# Patient Record
(demographics unavailable — no encounter records)

---

## 2024-10-11 NOTE — PHYSICAL EXAM
[No Respiratory Distress] : no respiratory distress  [No Accessory Muscle Use] : no accessory muscle use [Normal] : affect was normal and insight and judgment were intact [de-identified] : mild edema of lower ext [de-identified] : RLE w/ dried wound

## 2024-10-11 NOTE — HISTORY OF PRESENT ILLNESS
[Post-hospitalization from ___ Hospital] : Post-hospitalization from [unfilled] Hospital [Admitted on: ___] : The patient was admitted on [unfilled] [Discharged on ___] : discharged on [unfilled] [FreeTextEntry2] : 67-year-old female with a history of breast cancer, colon cancer, skin cancer, DVT on anticoagulation, hyperlipidemia, overactive bladder, asthma is coming in for post hospital discharge follow up for leg swelling.  Hospital course unclear, as no hospital records were sent over. As per patient, DVT was ruled out and diuretics were given? Unclear if TTE was done.   Swelling slightly improved now, did have a wound that has now dried up.   Last chemo was 8/27/24 for breast cancer. Wants new medical oncologist.

## 2024-10-11 NOTE — ASSESSMENT
[FreeTextEntry1] : 67-year-old female with a history of breast cancer, colon cancer, skin cancer, DVT on anticoagulation, hyperlipidemia, overactive bladder, asthma is coming in for post hospital discharge follow up for leg swelling.  #Leg swelling  -CBC, CMP, Pro-BNP ordered -Requested records from [facility] -May need TTE  RTC 1 month

## 2024-10-11 NOTE — ASSESSMENT
[FreeTextEntry1] : 67-year-old female with a history of breast cancer, colon cancer, skin cancer, DVT on anticoagulation, hyperlipidemia, overactive bladder, asthma is coming in for post hospital discharge follow up for leg swelling.  #Leg swelling  -CBC, CMP, Pro-BNP ordered -Requested records from [facility] -May need TTE  RTC 1 month

## 2024-10-11 NOTE — PHYSICAL EXAM
[No Respiratory Distress] : no respiratory distress  [No Accessory Muscle Use] : no accessory muscle use [Normal] : affect was normal and insight and judgment were intact [de-identified] : mild edema of lower ext [de-identified] : RLE w/ dried wound

## 2024-11-08 NOTE — PHYSICAL EXAM
[Normal Sclera/Conjunctiva] : normal sclera/conjunctiva [PERRL] : pupils equal round and reactive to light [EOMI] : extraocular movements intact [No Lymphadenopathy] : no lymphadenopathy [Supple] : supple [Thyroid Normal, No Nodules] : the thyroid was normal and there were no nodules present [No Respiratory Distress] : no respiratory distress  [No Accessory Muscle Use] : no accessory muscle use [Clear to Auscultation] : lungs were clear to auscultation bilaterally [Normal Rate] : normal rate  [Regular Rhythm] : with a regular rhythm [No Edema] : there was no peripheral edema [Soft] : abdomen soft [Non Tender] : non-tender [Non-distended] : non-distended [Normal Supraclavicular Nodes] : no supraclavicular lymphadenopathy [Normal Anterior Cervical Nodes] : no anterior cervical lymphadenopathy [Normal] : affect was normal and insight and judgment were intact [No Focal Deficits] : no focal deficits [de-identified] : Thyroid slightly enlarged on PE

## 2024-11-08 NOTE — HISTORY OF PRESENT ILLNESS
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  Colon cancer: Laparoscopic anterior resection for RECTAL CANCER (stage I). Dr. Navdeep KONG. Postoperatively he met with medical oncology (Dr. Darren CASH). No adjuvant therapy recommended for her CRC. Had Colonoscopy May 2023. Due in May 2025.  Skin Cancer: s/p Mohs Surgery, not seeing Derm regularly  R Breast Cancer diagnosed in 2020: Lumpectomy Dr. Jonah SULLIVAN breast cancer recently diagnosed 2/24. Had 4 sessions of chemo at [facility], Dr. Granado. Also s/p lumpectomy   R knee pain worsening for past 6 months.   Going to see Dr. John Moody-Onc at [facility] for radiation for breast cancer.   Seeing Vascular Surgeon Dr. Chema Reddy for history of DVT/PE

## 2024-11-08 NOTE — ASSESSMENT
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  #Breast Cancer  -Going to see Dr. Lopez - Rad-Onc at [facility] for radiation  #Hx of colon cancer -Due for colonoscopy in [place]y 2025  #DVT/PE -Following w/ Vascular, on indefinite AC -C/w Eliquis 5mg BID  #Hypthyroidism -Discussed how levothyroxine is preferred to armour thyroid. Patient prefers not to switch  #HLD -Discussed recommendation for statin, patient refusing -Recheck lipid profile in 3 months  #HCM -Reviewed bw  -Colonoscopy next year  -DEXA ordered  -Mammo and breast US scheduled  -Recommended that patient see Derm at least annually, given history of skin cancer   RTC in 1 month for further HCM needs

## 2024-11-08 NOTE — PHYSICAL EXAM
[Normal Sclera/Conjunctiva] : normal sclera/conjunctiva [PERRL] : pupils equal round and reactive to light [EOMI] : extraocular movements intact [No Lymphadenopathy] : no lymphadenopathy [Supple] : supple [Thyroid Normal, No Nodules] : the thyroid was normal and there were no nodules present [No Respiratory Distress] : no respiratory distress  [No Accessory Muscle Use] : no accessory muscle use [Clear to Auscultation] : lungs were clear to auscultation bilaterally [Normal Rate] : normal rate  [Regular Rhythm] : with a regular rhythm [No Edema] : there was no peripheral edema [Soft] : abdomen soft [Non Tender] : non-tender [Non-distended] : non-distended [Normal Supraclavicular Nodes] : no supraclavicular lymphadenopathy [Normal Anterior Cervical Nodes] : no anterior cervical lymphadenopathy [Normal] : affect was normal and insight and judgment were intact [No Focal Deficits] : no focal deficits [de-identified] : Thyroid slightly enlarged on PE

## 2024-11-08 NOTE — HISTORY OF PRESENT ILLNESS
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  Colon cancer: Laparoscopic anterior resection for RECTAL CANCER (stage I). Dr. Navdeep KONG. Postoperatively he met with medical oncology (Dr. Darren CASH). No adjuvant therapy recommended for her CRC. Had Colonoscopy May 2023. Due in May 2025.  Skin Cancer: s/p Mohs Surgery, not seeing Derm regularly  R Breast Cancer diagnosed in 2020: Lumpectomy Dr. Jonah SULLIVAN breast cancer recently diagnosed 2/24. Had 4 sessions of chemo at [facility], Dr. Granado. Also s/p lumpectomy   R knee pain worsening for past 6 months.   Going to see Dr. John Moody-Onc at [facility] for radiation for breast cancer.   Seeing Vascular Surgeon Dr. Chema Reddy for history of DVT/PE

## 2024-11-08 NOTE — ASSESSMENT
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  #Breast Cancer  -Going to see Dr. Lopez - Rad-Onc at [facility] for radiation  #Hx of colon cancer -Due for colonoscopy in MAy 2025  #DVT/PE -Following w/ Vascular, on indefinite AC -C/w Eliquis 5mg BID  #Hypthyroidism -Discussed how levothyroxine is preferred to armour thyroid. Patient prefers not to switch  #HLD -Discussed recommendation for statin, patient refusing -Recheck lipid profile in 3 months  #HCM -Reviewed bw  -Colonoscopy next year  -DEXA ordered  -Mammo and breast US scheduled  -Recommended that patient see Derm at least annually, given history of skin cancer   RTC in 1 month for further HCM needs

## 2024-11-08 NOTE — HEALTH RISK ASSESSMENT
[Yes] : Yes [2 - 4 times a month (2 pts)] : 2-4 times a month (2 points) [1 or 2 (0 pts)] : 1 or 2 (0 points) [Never (0 pts)] : Never (0 points) [No] : In the past 12 months have you used drugs other than those required for medical reasons? No [Never] : Never [Audit-CScore] : 2 [ColonoscopyDate] : 05/23 [ColonoscopyComments] : Repeat in 2 years is recommended

## 2024-12-09 NOTE — HISTORY OF PRESENT ILLNESS
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   Discussed DEXA results.   L leg swelling chronic, has not seen Vascular in a while.   Finished with radiation, last session on 12/3. Going to see Rad-Onc in 1 month.

## 2024-12-09 NOTE — ASSESSMENT
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   #HLD -Discussed LDL once again, statin benefits vs risks  -Patient willing to try statin -Start Crestor 10mg qd, will recheck LDL-C in 2-3 months   #OSteopenia -Calcium + Vit D supplements  -Repeat DEXA in 2 years   #MAcrocytosis #Neuropathic pain -B12 and folate ordered  #L leg swelling -Recommended for patient to follow up w/ her Vascular Surgeon  RTC in 3 months to cehck lipid profile and CMP

## 2024-12-11 NOTE — HISTORY OF PRESENT ILLNESS
[FreeTextEntry1] : This patient presents today for follow-up regarding posttraumatic osteoarthritis about the right knee.  She continues complain of significant pain about the right knee.  We did have authorization earlier in the year the patient had issues with breast cancer did not wish to proceed with the surgery.  She presents today with worsening pain about the knee right 10 out of 10.  Pain worse with activity improved with rest.  She takes Tylenol Extra Strength for the pain.

## 2024-12-11 NOTE — PHYSICAL EXAM
[FreeTextEntry2] : The patient appears well nourished  and in no apparent distress.  The patient is alert and oriented to person, place, and time.   Affect and mood appear normal.    The head is normocephalic and atraumatic.  The eyes reveal normal sclera and extra ocular muscles are intact.   The neck appears normal with no jugular venous distention or masses noted.   Skin shows normal turgor with no evidence of eczema or psoriasis.  No respiratory distress noted.  The patient ambulates with a normal gait.  The right knee has a slight flexion contracture of approximately 5. The patient is able to flex to approximately 120. There is crepitations with range of motion.  There is varus alignment.  There is tenderness to palpation about the medial joint..There is a negative Micah sign. There is no effusion. There is no soft tissue swelling, warmth, or erythema.   There is a negative Lachman sign.  There is no instability to varus/valgus stress or anterior/posterior drawer.  There is normal strength in the in the quadriceps and hamstring muscles.  Sensation is intact to the lower estremity. There are normal pulses distally and good capillary refill. No edema or lymphadenopathy noted.

## 2024-12-11 NOTE — DISCUSSION/SUMMARY
[de-identified] : The patient presents today for follow-up regarding posttraumatic osteoarthritis of the right knee.  She is ready to proceed with right knee replacement surgery.  We did obtain authorization earlier in the year but she had to postpone secondary to her breast cancer diagnosis.  She is ready to proceed with surgery now she is having severe pain and problems ambulating any significant degree.  Will rerequest authorization from [employer] and call her when the authorization is received and we could begin to schedule her surgery.  The risks benefits and alternative treatment plans of the surgical procedure were explained to the patient. The patient had the opportunity to ask any questions which answered to their satisfaction.   At least 30 minutes was spent performing the evaluation and management on today's office visit.  This includes but is not limited to preparing to see patient including review of any test results or outside medical records, obtaining and/or reviewing separately obtained history, performing examination and evaluation, counseling and educating the patient on their diagnosis and treatment recommendations, ordering medications, tests, or procedures, documenting clinical information in the electronic health record, independently interpreting results (not separately reported) and communicating results to the patient, and coordination of care.

## 2024-12-11 NOTE — REASON FOR VISIT
[Follow-Up Visit] : a follow-up visit for [Knee Pain] : knee pain [FreeTextEntry2] : follow up evaluation of post traumatic osteoarthritis of the right knee.

## 2025-01-17 NOTE — HEALTH RISK ASSESSMENT
[Other reason not done] : Other reason not done [Patient unable to screen] : Patient unable to screen

## 2025-01-17 NOTE — ASSESSMENT
[FreeTextEntry1] : 20 minutes were spent discussing concerns/medical conditions excluding procedure time.

## 2025-01-17 NOTE — REVIEW OF SYSTEMS
[Cough] : cough [Negative] : Constitutional [Shortness Of Breath] : no shortness of breath [Wheezing] : no wheezing [Dyspnea on Exertion] : no dyspnea on exertion

## 2025-01-17 NOTE — HISTORY OF PRESENT ILLNESS
[Home] : at home, [unfilled] , at the time of the visit. [Medical Office: ([facility])___] : at the medical office located in  [Verbal consent obtained from patient] : the patient, [unfilled] [FreeTextEntry8] : Pt has been experiencing productive cough since the last 7 days, worse at night. She suffers from asthma and had prior Hx of bronchitis and refer feeling the same way now

## 2025-01-17 NOTE — HISTORY OF PRESENT ILLNESS
[Home] : at home, [unfilled] , at the time of the visit. [Medical Office: ([facility])___] : at the medical office located in  [Verbal consent obtained from patient] : the patient, [unfilled] [FreeTextEntry8] : Pt has been experiencing productive cough since the last 7 days, worse at night. She suffers from asthma and had prior Hx of bronchitis and refer feeling the same way now

## 2025-03-11 NOTE — HISTORY OF PRESENT ILLNESS
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  Took her rosuvastatin initially for about 2 weeks, then developed myalgias and discontinued her statin. Has not taken anything since then. Wants to have her A1C checked too.   Otherwise, no acute complaints. Has been working on dietary modification

## 2025-03-11 NOTE — PHYSICAL EXAM
[No Respiratory Distress] : no respiratory distress  [No Accessory Muscle Use] : no accessory muscle use [Clear to Auscultation] : lungs were clear to auscultation bilaterally [Normal Rate] : normal rate  [Regular Rhythm] : with a regular rhythm [Normal] : affect was normal and insight and judgment were intact

## 2025-03-11 NOTE — ASSESSMENT
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  #HLD -Check lipid profile and CMP today  #preDM -Check A1C  RTC depending on results

## 2025-04-09 NOTE — HISTORY OF PRESENT ILLNESS
[Asthma] : asthma [Chronic Anticoagulation] : chronic anticoagulation [(Patient denies any chest pain, claudication, dyspnea on exertion, orthopnea, palpitations or syncope)] : Patient denies any chest pain, claudication, dyspnea on exertion, orthopnea, palpitations or syncope [Moderate (4-6 METs)] : Moderate (4-6 METs) [Anticoagulants: _____] : Anticoagulants: [unfilled] [Aortic Stenosis] : no aortic stenosis [Atrial Fibrillation] : no atrial fibrillation [Coronary Artery Disease] : no coronary artery disease [Recent Myocardial Infarction] : no recent myocardial infarction [Implantable Device/Pacemaker] : no implantable device/pacemaker [COPD] : no COPD [Sleep Apnea] : no sleep apnea [Smoker] : not a smoker [Family Member] : no family member with adverse anesthesia reaction/sudden death [Self] : no previous adverse anesthesia reaction [Chronic Kidney Disease] : no chronic kidney disease [Diabetes] : no diabetes [FreeTextEntry1] : Right Knee Replacement [FreeTextEntry2] : 4/10/25 [FreeTextEntry4] : 68-year-old female with a history of breast cancer, colon cancer (s/p sigmoid resection), skin cancer s/p Mohs Surgery, 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  Saw Vascular Dr. Chema Reddy Tel: [phone], Fax: [phone]. Plan to stop Eliquis Friday 4/4, then Saturday 4/5 will have IVC Filter, will then likely resume Eliquis 4/5 - 4/6. Will Then hold Eliquis 4/7 - 4/10  Last year, saw Cardiologist Dr. Roman Perkins, cleared for surgery at that time. Had TTE done.   Following w/ Oncologist Dr. Dale Jara in [place]  [FreeTextEntry7] : 4/23/24 TTE showed normal LVEF w/ no regional wall motion abnormalities and no significant valvular dysfunction.

## 2025-04-09 NOTE — HISTORY OF PRESENT ILLNESS
[Asthma] : asthma [Chronic Anticoagulation] : chronic anticoagulation [(Patient denies any chest pain, claudication, dyspnea on exertion, orthopnea, palpitations or syncope)] : Patient denies any chest pain, claudication, dyspnea on exertion, orthopnea, palpitations or syncope [Moderate (4-6 METs)] : Moderate (4-6 METs) [Anticoagulants: _____] : Anticoagulants: [unfilled] [Aortic Stenosis] : no aortic stenosis [Atrial Fibrillation] : no atrial fibrillation [Coronary Artery Disease] : no coronary artery disease [Recent Myocardial Infarction] : no recent myocardial infarction [Implantable Device/Pacemaker] : no implantable device/pacemaker [COPD] : no COPD [Sleep Apnea] : no sleep apnea [Smoker] : not a smoker [Family Member] : no family member with adverse anesthesia reaction/sudden death [Self] : no previous adverse anesthesia reaction [Chronic Kidney Disease] : no chronic kidney disease [Diabetes] : no diabetes [FreeTextEntry1] : Right Knee Replacement [FreeTextEntry2] : 4/10/25 [FreeTextEntry4] : 68-year-old female with a history of breast cancer, colon cancer (s/p sigmoid resection), skin cancer s/p Mohs Surgery, 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  Saw Vascular Dr. Chema Reddy Tel: [phone], Fax: [phone]. Plan to stop Eliquis Friday 4/4, then Saturday 4/5 will have IVC Filter, will then likely resume Eliquis 4/5 - 4/6. Will Then hold Eliquis 4/7 - 4/10  Last year, saw Cardiologist Dr. Roman Perkins, cleared for surgery at that time. Had TTE done.   Following w/ Oncologist Dr. Dale Jara in [place]  [FreeTextEntry7] : 4/23/24 TTE showed normal LVEF w/ no regional wall motion abnormalities and no significant valvular dysfunction.

## 2025-04-09 NOTE — HISTORY OF PRESENT ILLNESS
[Asthma] : asthma [Chronic Anticoagulation] : chronic anticoagulation [(Patient denies any chest pain, claudication, dyspnea on exertion, orthopnea, palpitations or syncope)] : Patient denies any chest pain, claudication, dyspnea on exertion, orthopnea, palpitations or syncope [Moderate (4-6 METs)] : Moderate (4-6 METs) [Anticoagulants: _____] : Anticoagulants: [unfilled] [Aortic Stenosis] : no aortic stenosis [Atrial Fibrillation] : no atrial fibrillation [Coronary Artery Disease] : no coronary artery disease [Recent Myocardial Infarction] : no recent myocardial infarction [Implantable Device/Pacemaker] : no implantable device/pacemaker [COPD] : no COPD [Sleep Apnea] : no sleep apnea [Smoker] : not a smoker [Family Member] : no family member with adverse anesthesia reaction/sudden death [Self] : no previous adverse anesthesia reaction [Chronic Kidney Disease] : no chronic kidney disease [Diabetes] : no diabetes [FreeTextEntry1] : Right Knee Replacement [FreeTextEntry2] : 4/10/25 [FreeTextEntry4] : 68-year-old female with a history of breast cancer, colon cancer (s/p sigmoid resection), skin cancer s/p Mohs Surgery, 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  Saw Vascular Dr. Chema Reddy Tel: [phone], Fax: [phone]. Plan to stop Eliquis Friday 4/4, then Saturday 4/5 will have IVC Filter, will then likely resume Eliquis 4/5 - 4/6. Will Then hold Eliquis 4/7 - 4/10  Last year, saw Cardiologist Dr. Roman Perkins, cleared for surgery at that time. Had TTE done.   Following w/ Oncologist Dr. Dale Jara in [place]  [FreeTextEntry7] : 4/23/24 TTE showed normal LVEF w/ no regional wall motion abnormalities and no significant valvular dysfunction.

## 2025-04-09 NOTE — ASSESSMENT
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  #Preop exam -Reviewed CBC, CMP, EKG from PST, results stable. -Cleared by Vascular surgery -METS>4, no active cardiac symptoms, TTE last year that was unremarkable   Patient is medically optimzied for this intermediate risk surgery.  [FreeTextEntry5] : As per Vascular Surgery recommendations  [FreeTextEntry7] :  As per surgical team

## 2025-04-09 NOTE — ASSESSMENT
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  #Preop exam -Reviewed CBC, CMP, EKG from PST, results stable. -Cleared by Vascular surgery -METS>4, no active cardiac symptoms, TTE last year that was unremarkable   Patient is medically optimzied for this intermediate risk surgery.  [FreeTextEntry5] : As per Vascular Surgery recommendations  [FreeTextEntry7] :  As per surgical team

## 2025-04-23 NOTE — HISTORY OF PRESENT ILLNESS
[___ Weeks Post Op] : [unfilled] weeks post op [7] : the patient reports pain that is 7/10 in severity [Chills] : no chills [Fever] : no fever [Nausea] : no nausea [Vomiting] : no vomiting [de-identified] : Right total knee replacement DOS: 04/10/25 [de-identified] : This patient presents for the first postoperative visit.  She is status post total knee replacement performed approximately 13 days ago.  Pain level 7 out of 10.  She notes some numbness and tingling in her foot.  She notes some swelling and stiffness about the knee.  Denies fever or chills.  Denies any drainage from the incision.  She is currently in inpatient rehabilitation facility. [de-identified] : Exam of the right knee reveals range of motion is satisfactory.  The incision is clean, dry, and intact.   No evidence of any drainage is noted.  There is no warmth or erythema.  There is no ecchymosis.   There is no effusion.  The knee is stable to varus and valgus stress.  There is a negative posterior drawer.  Extensor mechanism is intact.  Patellofemoral tracking is satisfactory.  Normal puluses and sensation distally.    No edema or lymphadenopathy noted.  Strength and sensation are intact distally. [de-identified] : AP, lateral, and merchant views of the right knee were obtained.  The patient is status post total knee replacement.  The components are well fixed with good alignment. No evidence of loosening or periprosthetic fracture is noted. [de-identified] : The Patient is doing well status post total knee replacement.  The patient is going to start outpatient physical therapy.  The patient was given a prescription for outpatient physical therapy.  She can start outpatient therapy when she is discontinued from the rehabilitation facility..  Instructions were given regarding restrictions on activity level.  The patient should avoid excessive walking and stair climbing until the next post op visit. The patient was also given instructions on bathing and wound care.  The patient can shower and pat the incision dry with a towel.  Patient should avoid any pools or tubs for the next 2 weeks.   If the Dermabond tape has not been removed the patient can remove the tape at the end of this week after showering. Instructions were given regarding continuing  post operative anticoagulation.  We will see the patient back in 4 weeks for follow-up x-rays and reevaluation.

## 2025-06-03 NOTE — DISCUSSION/SUMMARY
[de-identified] : The patient presents today for follow-up status post right total knee replacement.  She did have a cellulitis of the knee treated at [facility] with antibiotics.  She finished her antibiotics and her physical exam does not reveal evidence of any residual cellulitis.  She is doc start outpatient physical therapy.  She was given prescription to that effect.  I will see her back in 6 weeks for follow-up and reevaluation.  In addition we renewed her oxycodone prescription as she is running low.

## 2025-06-03 NOTE — REASON FOR VISIT
[Workers' Comp: Date of Injury: _______] : This visit is related to worker's compensation. Date of Injury: [unfilled] [FreeTextEntry2] : right knee pain

## 2025-06-03 NOTE — HISTORY OF PRESENT ILLNESS
[FreeTextEntry1] : This patient presents today for follow-up status post right total knee replacement approxi-6 weeks ago.  She was seen in [facility] on May 19 where she was noted to have a cellulitis of the knee.  She was placed on antibiotics and she presents today for routine follow-up approximately 6 weeks postop.  She notes soreness and increased pain at nighttime.  She also notes some intermittent swelling.  Denies fever or chills.  Currently ambulating with a cane. [Has the patient missed work because of the injury/illness?] : The patient has missed work because of the injury/illness. [No] : The patient is currently not working.

## 2025-06-03 NOTE — ASSESSMENT
[Indicate if, in your opinion, the incident that the patient described was the competent medical cause of this injury/illness.] : The incident that the patient described was the competent medical cause of this injury/illness: Yes [Indicate if the patient's complaints are consistent with his/her history of the injury/illness.] : Indicate if the patient's complaints are consistent with his/her history of the injury/illness: Yes [Yes] : Yes, it is consistent [FreeTextEntry5] : 100

## 2025-07-16 NOTE — REASON FOR VISIT
[Workers' Comp: Date of Injury: _______] : This visit is related to worker's compensation. Date of Injury: [unfilled] [Post Operative Visit] : a post operative visit for [FreeTextEntry2] : s/p right TKR 4/10/2025

## 2025-07-16 NOTE — HISTORY OF PRESENT ILLNESS
[FreeTextEntry1] : This patient presents today status post right total knee replacement on April 10 of this year.  Patient notes some soreness intermittently with activity.  She is going to therapy twice a week.  She uses a cane for ambulation.  She takes Tylenol for the pain.  Denies any fever or chills.  Denies any drainage from the incision.  Overall doing very well after knee replacement surgery. [Has the patient missed work because of the injury/illness?] : The patient has missed work because of the injury/illness. [No] : The patient is currently not working.

## 2025-07-16 NOTE — PHYSICAL EXAM
[de-identified] : The patient appears well nourished  and in no apparent distress.  The patient is alert and oriented to person, place, and time.   Affect and mood appear normal.    The head is normocephalic and atraumatic.  The eyes reveal normal sclera and extra ocular muscles are intact.   The neck appears normal with no jugular venous distention or masses noted.   Skin shows normal turgor with no evidence of eczema or psoriasis.  No respiratory distress noted.  The patient ambulates with a normal gait.  Exam of the right knee reveals range of motion is satisfactory from 0 to 125 degrees..  The incision is well-healed.   No evidence of any drainage is noted.  There is no warmth or erythema.  There is no ecchymosis.   There is no effusion.  The knee is stable to varus and valgus stress.  There is a negative posterior drawer.  Extensor mechanism is intact.  Patellofemoral tracking is satisfactory.  Normal puluses and sensation distally.    No edema or lymphadenopathy noted.  Strength and sensation are intact distally. [de-identified] : AP, lateral, and merchant views of the right knee were obtained.  The patient is status post total knee replacement.  The components are well fixed with good alignment. No evidence of loosening or periprosthetic fracture is noted.

## 2025-07-16 NOTE — DISCUSSION/SUMMARY
[de-identified] : This patient presents in follow-up regarding right total knee replacement performed approximately 3 months ago.  Patient doing very well with regards to knee replacement surgery.  She can to continue physical therapy twice a week and see us back in 1 month for follow-up and reevaluation.  Elected to try to discontinue the cane over the next month as well.  At least 20 minutes was spent performing the evaluation and management on today's office visit.  This includes but is not limited to preparing to see patient including review of any test results or outside medical records, obtaining and/or reviewing separately obtained history, performing examination and evaluation, counseling and educating the patient on their diagnosis and treatment recommendations, ordering medications, tests, or procedures, documenting clinical information in the electronic health record, independently interpreting results (not separately reported) and communicating results to the patient, and coordination of care.